# Patient Record
Sex: FEMALE | Race: OTHER | NOT HISPANIC OR LATINO
[De-identification: names, ages, dates, MRNs, and addresses within clinical notes are randomized per-mention and may not be internally consistent; named-entity substitution may affect disease eponyms.]

---
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
## 2023-02-01 ENCOUNTER — EMERGENCY (EMERGENCY)
Facility: HOSPITAL | Age: 23
LOS: 0 days | Discharge: HOME | End: 2023-02-01
Attending: EMERGENCY MEDICINE | Admitting: EMERGENCY MEDICINE
Payer: MEDICAID

## 2023-02-01 VITALS
RESPIRATION RATE: 17 BRPM | HEIGHT: 69.5 IN | DIASTOLIC BLOOD PRESSURE: 69 MMHG | WEIGHT: 153 LBS | TEMPERATURE: 99 F | SYSTOLIC BLOOD PRESSURE: 109 MMHG | HEART RATE: 74 BPM | OXYGEN SATURATION: 100 %

## 2023-02-01 VITALS
TEMPERATURE: 98 F | SYSTOLIC BLOOD PRESSURE: 114 MMHG | HEART RATE: 70 BPM | OXYGEN SATURATION: 100 % | RESPIRATION RATE: 18 BRPM | DIASTOLIC BLOOD PRESSURE: 60 MMHG

## 2023-02-01 DIAGNOSIS — M25.571 PAIN IN RIGHT ANKLE AND JOINTS OF RIGHT FOOT: ICD-10-CM
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
## 2023-02-01 DIAGNOSIS — S93.401A SPRAIN OF UNSPECIFIED LIGAMENT OF RIGHT ANKLE, INITIAL ENCOUNTER: ICD-10-CM

## 2023-02-01 PROCEDURE — 73630 X-RAY EXAM OF FOOT: CPT | Mod: 26,RT

## 2023-02-01 PROCEDURE — 29515 APPLICATION SHORT LEG SPLINT: CPT

## 2023-02-01 PROCEDURE — 99284 EMERGENCY DEPT VISIT MOD MDM: CPT | Mod: 25

## 2023-02-01 PROCEDURE — 73610 X-RAY EXAM OF ANKLE: CPT | Mod: 26,RT

## 2023-02-01 RX ORDER — IBUPROFEN 200 MG
600 TABLET ORAL ONCE
Refills: 0 | Status: COMPLETED | OUTPATIENT
Start: 2023-02-01 | End: 2023-02-01

## 2023-02-01 RX ADMIN — Medication 600 MILLIGRAM(S): at 20:56

## 2023-02-01 RX ADMIN — Medication 600 MILLIGRAM(S): at 21:26

## 2023-02-01 NOTE — ED PROVIDER NOTE - CLINICAL SUMMARY MEDICAL DECISION MAKING FREE TEXT BOX
22-year-old female with right foot sprain this morning.  X-ray ordered and interpreted by me as negative for acute fracture /dislocation, analgesia given, splint/crutches given.  Patient stable for discharge home, advised to follow-up with Ortho within the week.  Strict return precautions given.  Patient verbalized understanding is amenable to plan.

## 2023-02-01 NOTE — ED ADULT TRIAGE NOTE - CHIEF COMPLAINT QUOTE
I fell earlier today and now my foot is starting to hurt  a lot (right) - patient  Patient denies hitting her head, denies anticoagulants

## 2023-02-01 NOTE — ED PROVIDER NOTE - NS ED ATTENDING STATEMENT MOD
This was a shared visit with the JESSIKA. I reviewed and verified the documentation and independently performed the documented:

## 2023-02-01 NOTE — ED PROVIDER NOTE - PROGRESS NOTE DETAILS
EP: Analgesia  given, x-ray negative for acute pathology, splint applied, advised to follow-up with  orthopedics in a week suture precautions given.

## 2023-02-01 NOTE — ED PROVIDER NOTE - PHYSICAL EXAMINATION
VITAL SIGNS: I have reviewed nursing notes and confirm.  CONSTITUTIONAL: Patient is in no acute distress.  SKIN: Skin exam is warm and dry, no acute rash.  HEAD: Normocephalic; atraumatic.  EYES: PERRL, EOM intact; conjunctiva and sclera clear.  ENT: No nasal discharge; airway clear.   NECK: Supple; non tender.  CARD: S1, S2 normal; no murmurs, gallops, or rubs. Regular rate and rhythm.  RESP: Clear to auscultation bilaterally. No wheezes, rales or rhonchi.  ABD: Normal bowel sounds; soft; non-distended; non-tender.   EXT: Normal ROM. +Tenderness to lateral malleolus.   LYMPH: No acute cervical adenopathy.  NEURO: Alert, oriented. Grossly unremarkable. No focal deficits.  PSYCH: Cooperative, appropriate.

## 2023-02-01 NOTE — ED PROVIDER NOTE - CARE PROVIDER_API CALL
Tyler Prather (MD)  Orthopaedic Surgery  [address] [place] [address]  Phone: [phone]  Fax: [phone]  Follow Up Time: 1-3 Days

## 2023-02-01 NOTE — ED PROVIDER NOTE - PATIENT PORTAL LINK FT
You can access the FollowMyHealth Patient Portal offered by [facility] by registering at the following website: [email or web address]. By joining [employer]’s FollowMyHealth portal, you will also be able to view your health information using other applications (apps) compatible with our system.

## 2023-02-01 NOTE — ED PROVIDER NOTE - OBJECTIVE STATEMENT
23 yo F presenting with right ankle pain after twisting injury and she slipped while at school. No paresthesias or weakness. Reports some associated swelling.

## 2023-02-01 NOTE — ED PROVIDER NOTE - ATTENDING APP SHARED VISIT CONTRIBUTION OF CARE
22-year-old female without any pertinent past medical history presented for evaluation of right foot pain and swelling status post fall.  Patient states that this morning while in school she slipped on salt and twisted her ankle, has been able to ambulate since, but pain became worse in the afternoon which prompted to ED visit.  Well-appearing young female in no acute distress,, extremity is  neurovascularly intact, +tenderness to palpation of the lateral malleolus with minimal swelling of the lateral  aspect of the right foot, FROM at the ankle and knee joints, ambulating with a mild limp.  Impression/plan: Most likely sprain, analgesia, x-ray;  will apply splint/crutches, follow-up with Ortho within a week. Patient is amenable with the plan.

## 2023-02-03 ENCOUNTER — APPOINTMENT (OUTPATIENT)
Dept: ORTHOPEDIC SURGERY | Facility: CLINIC | Age: 23
End: 2023-02-03
Payer: MEDICAID

## 2023-02-03 VITALS — WEIGHT: 153 LBS | HEIGHT: 69 IN | BODY MASS INDEX: 22.66 KG/M2

## 2023-02-03 DIAGNOSIS — S93.601A UNSPECIFIED SPRAIN OF RIGHT FOOT, INITIAL ENCOUNTER: ICD-10-CM

## 2023-02-03 PROCEDURE — 99203 OFFICE O/P NEW LOW 30 MIN: CPT

## 2023-02-03 NOTE — PHYSICAL EXAM
[Right] : right foot and ankle [2+] : dorsalis pedis pulse: 2+ [] : mildly antalgic [FreeTextEntry8] :   No tenderness to palpation over the medial or lateral malleoli.  No tenderness to palpation over the ankle ligaments.  No tenderness to palpation over the plantar surface of the right foot.  She has tenderness to palpation over the dorsal surface of the foot in the area the midfoot.  Mild tenderness to palpation over the Lisfranc. [de-identified] :   Good range of motion with dorsiflexion, plantar flexion, inversion and eversion. [de-identified] :   Today she is ambulating with 1 crutch.

## 2023-02-03 NOTE — DISCUSSION/SUMMARY
[de-identified] :   I reviewed the x-ray findings with the patient.  Today she was placed into a short Cam walker boot, she will remain nonweightbearing with crutches.  I will see her back in a week for further evaluation, if she is  on exam I would recommend repeat weight-bearing foot x-rays.  She can use ibuprofen for pain.\par \par Supervising physician:  Dr. Prather

## 2023-02-03 NOTE — HISTORY OF PRESENT ILLNESS
[de-identified] :  The patient is a 22-year-old female here for evaluation of her right ankle and right foot.  On 02/01/2023 she slipped on ice and salt outside her school, rolling her ankle.  She had x-rays at [facility] that same day.  She presents today in a posterior splint using 1 crutch to ambulate.  She points over the dorsal surface of the midfoot as to where the pain is.

## 2023-02-03 NOTE — DATA REVIEWED
[Right] : of the right [Ankle] : ankle [Foot] : foot [FreeTextEntry1] :  X-rays taken on 02/01/2023 at [facility] of the right ankle and foot showed no evidence of an acute fracture.

## 2023-02-10 ENCOUNTER — APPOINTMENT (OUTPATIENT)
Dept: ORTHOPEDIC SURGERY | Facility: CLINIC | Age: 23
End: 2023-02-10
Payer: MEDICAID

## 2023-02-10 DIAGNOSIS — S93.601D UNSPECIFIED SPRAIN OF RIGHT FOOT, SUBSEQUENT ENCOUNTER: ICD-10-CM

## 2023-02-10 PROCEDURE — 99213 OFFICE O/P EST LOW 20 MIN: CPT

## 2023-02-10 NOTE — DISCUSSION/SUMMARY
[de-identified] : At this point the patient is doing well.  Her short cam walker boot is continued.  She may weight-bear as tolerated without crutches.  We will see her back on an as-needed basis for her right foot\par \par Supervising Physician: Dr. Prather

## 2023-02-10 NOTE — PHYSICAL EXAM
[Right] : right foot and ankle [2+] : dorsalis pedis pulse: 2+ [] : non-antalgic [de-identified] : Full range of motion with dorsiflexion, plantar flexion, inversion and eversion. [de-identified] :   Today she is ambulating with 1 crutch.

## 2023-02-10 NOTE — HISTORY OF PRESENT ILLNESS
[de-identified] :  The patient is a 22-year-old female accompanied by her father here for a re-evaluation of her right foot.  On 02/01/2023 she slipped on ice and salt outside her school, rolling her ankle.  She had x-rays at [facility] that same day.  She states her foot pain has resolved.  She reports that her arms more from using the crutches that her foot does.

## 2024-10-20 ENCOUNTER — NON-APPOINTMENT (OUTPATIENT)
Age: 24
End: 2024-10-20